# Patient Record
Sex: MALE | Race: ASIAN | URBAN - METROPOLITAN AREA
[De-identification: names, ages, dates, MRNs, and addresses within clinical notes are randomized per-mention and may not be internally consistent; named-entity substitution may affect disease eponyms.]

---

## 2021-11-15 ENCOUNTER — OFFICE VISIT (OUTPATIENT)
Dept: URBAN - METROPOLITAN AREA CLINIC 37 | Facility: CLINIC | Age: 86
End: 2021-11-15

## 2021-11-15 VITALS
HEART RATE: 76 BPM | HEIGHT: 64 IN | SYSTOLIC BLOOD PRESSURE: 148 MMHG | BODY MASS INDEX: 25.27 KG/M2 | OXYGEN SATURATION: 98 % | DIASTOLIC BLOOD PRESSURE: 84 MMHG | WEIGHT: 148 LBS

## 2021-11-15 PROBLEM — 267434003: Status: ACTIVE | Noted: 2021-11-14

## 2021-11-15 PROBLEM — 275978004 SCREENING FOR MALIGNANT NEOPLASM OF COLON: Status: ACTIVE | Noted: 2021-11-15

## 2021-11-15 PROBLEM — 710815001: Status: ACTIVE | Noted: 2021-11-14

## 2021-11-15 PROBLEM — 59621000: Status: ACTIVE | Noted: 2021-11-14

## 2021-11-15 PROBLEM — 313436004: Status: ACTIVE | Noted: 2021-11-14

## 2021-11-15 PROBLEM — 266569009: Status: ACTIVE | Noted: 2021-11-14

## 2021-11-15 RX ORDER — MECLIZINE HYDROCHLORIDE 25 MG/1
1 TABLET AS NEEDED TABLET ORAL BID, PRN
Status: ACTIVE | COMMUNITY

## 2021-11-15 RX ORDER — METFORMIN HYDROCHLORIDE 850 MG/1
1 TABLET WITH A MEAL TABLET, FILM COATED ORAL THREE TIMES A DAY
Status: ACTIVE | COMMUNITY

## 2021-11-15 RX ORDER — TAMSULOSIN HYDROCHLORIDE 0.4 MG/1
1 CAPSULE CAPSULE ORAL ONCE A DAY
Qty: 30 | Status: ACTIVE | COMMUNITY

## 2021-11-15 RX ORDER — ADALIMUMAB 40MG/0.8ML
0.8 ML KIT SUBCUTANEOUS EVERY 2 WEEKS
Qty: 2 PEN INJECTORS | Status: ACTIVE | COMMUNITY

## 2021-11-15 RX ORDER — FLUTICASONE PROPIONATE 50 UG/1
1 SPRAY IN EACH NOSTRIL SPRAY, METERED NASAL ONCE A DAY
Status: ACTIVE | COMMUNITY

## 2021-11-15 RX ORDER — SIMVASTATIN 10 MG/1
1 TABLET IN THE EVENING TABLET, FILM COATED ORAL ONCE A DAY
Status: ACTIVE | COMMUNITY

## 2021-11-15 RX ORDER — LISINOPRIL 10 MG/1
1 TABLET TABLET ORAL ONCE A DAY
Qty: 30 | Status: ACTIVE | COMMUNITY

## 2021-11-15 RX ORDER — ASCORBIC ACID/ASCORBATE SODIUM 500 MG
1 TABLET TABLET,CHEWABLE ORAL ONCE A DAY
Qty: 30 | Status: ACTIVE | COMMUNITY

## 2021-11-15 NOTE — HPI-MIGRATED HPI
Colorectal cancer screening : Family history of GI malignancy: -> Denies;   Colorectal cancer screening : Patient is here for -> average risk surveillance colonoscopy;   Colorectal cancer screening : Last colonoscopy was -> 7 years ago;   Colorectal cancer screening : Patients denies -> rectal bleeding, change in bowel habits, constipation, diarrhea, or abdominal pain;   Colorectal cancer screening : Current bowel movement patterns -> One soft BM daily;   Interim investigations : Imaging studies: ->  * US RUQ on 11/09/2021 at [facility]: Mild diffuse fatty liver. Otherwise, negative right upper quadrant sonogram. No gallbladder abnormality or gallstones seen;   Interim investigations : Labs done on: ->  * 07/26/2021 ordered by PCP:  - CMP: Glu: 98 ; BUN: 23 ; Cr: 1.17 ; Na: 133 (L) ; K: 4.6 ; Alb: 4.4 ; Tbili: 0.3 ; ALP: 55 ; ALT: 8 ; AST: 15  - CBC: WBC: 6.0 ; RBC: 3.43 (L) ; Hgb: 11.1 (L) ; Hct: 33.3 (L) ; Plt: 213;     Colorectal cancer screening : Denies dysphagia or odynophagia Currently taking anticoagulants/NSAIDs: Denies;

## 2021-12-25 ENCOUNTER — TELEPHONE ENCOUNTER (OUTPATIENT)
Dept: URBAN - METROPOLITAN AREA CLINIC 37 | Facility: CLINIC | Age: 86
End: 2021-12-25